# Patient Record
Sex: MALE | Race: WHITE | Employment: FULL TIME | URBAN - NONMETROPOLITAN AREA
[De-identification: names, ages, dates, MRNs, and addresses within clinical notes are randomized per-mention and may not be internally consistent; named-entity substitution may affect disease eponyms.]

---

## 2017-11-13 DIAGNOSIS — N20.0 CALCULUS OF KIDNEY: ICD-10-CM

## 2018-01-05 LAB
BUN BLDV-MCNC: 24 MG/DL
CALCIUM SERPL-MCNC: 8.8 MG/DL
CHLORIDE BLD-SCNC: 100 MMOL/L
CO2: 28 MMOL/L
CREAT SERPL-MCNC: 0.98 MG/DL
CREATININE, URINE: 208
GFR CALCULATED: >60
GLUCOSE BLD-MCNC: 183 MG/DL
MICROALBUMIN/CREAT 24H UR: 6.3 MG/G{CREAT}
MICROALBUMIN/CREAT UR-RTO: 3
POTASSIUM SERPL-SCNC: 4.3 MMOL/L
SODIUM BLD-SCNC: 136 MMOL/L

## 2018-01-26 ENCOUNTER — OFFICE VISIT (OUTPATIENT)
Dept: NEPHROLOGY | Age: 48
End: 2018-01-26
Payer: COMMERCIAL

## 2018-01-26 VITALS
SYSTOLIC BLOOD PRESSURE: 138 MMHG | OXYGEN SATURATION: 97 % | HEART RATE: 69 BPM | WEIGHT: 280.8 LBS | DIASTOLIC BLOOD PRESSURE: 78 MMHG

## 2018-01-26 DIAGNOSIS — R82.994 HYPERCALCINURIA: Primary | ICD-10-CM

## 2018-01-26 DIAGNOSIS — I10 BENIGN ESSENTIAL HTN: ICD-10-CM

## 2018-01-26 PROCEDURE — 99213 OFFICE O/P EST LOW 20 MIN: CPT | Performed by: INTERNAL MEDICINE

## 2018-01-26 RX ORDER — HYDROCHLOROTHIAZIDE 25 MG/1
25 TABLET ORAL DAILY
Qty: 100 TABLET | Refills: 3 | Status: SHIPPED | OUTPATIENT
Start: 2018-01-26 | End: 2019-01-23 | Stop reason: SDUPTHER

## 2018-01-26 ASSESSMENT — ENCOUNTER SYMPTOMS
CHEST TIGHTNESS: 0
RESPIRATORY NEGATIVE: 1
BACK PAIN: 0
COUGH: 0
FACIAL SWELLING: 0
NAUSEA: 0
CONSTIPATION: 0
BLOOD IN STOOL: 0
ABDOMINAL DISTENTION: 0
VOMITING: 0
WHEEZING: 0
ABDOMINAL PAIN: 0
SHORTNESS OF BREATH: 0
GASTROINTESTINAL NEGATIVE: 1
DIARRHEA: 0

## 2018-01-26 NOTE — PROGRESS NOTES
Visit Date: 1/26/2018      HPI:     Gayathri Shelton is a 56 y.o. male who presents today for:  Chief Complaint   Patient presents with    Nephrolithiasis    Hypertension       Current Outpatient Prescriptions   Medication Sig Dispense Refill    Lactobacillus (PROBIOTIC ACIDOPHILUS PO) Take by mouth      glyBURIDE-metformin (GLUCOVANCE) 2.5-500 MG per tablet Take 1 tablet by mouth 2 times daily (with meals)       allopurinol (ZYLOPRIM) 300 MG tablet Take 300 mg by mouth daily      losartan-hydrochlorothiazide (HYZAAR) 100-12.5 MG per tablet Take 1 tablet by mouth daily      Triamcinolone Acetonide (NASACORT ALLERGY 24HR NA) by Nasal route      naproxen sodium (ANAPROX) 550 MG tablet Take 550 mg by mouth 2 times daily (with meals)       No current facility-administered medications for this visit. No Known Allergies    Past Medical History:   Diagnosis Date    Diabetes ([address]     Hypertension     Kidney stone       Past Surgical History:   Procedure Laterality Date    HERNIA REPAIR       Family History   Problem Relation Age of Onset    Celiac Disease Mother     Urolithiasis Father     Diabetes Father     High Blood Pressure Father      Social History   Substance Use Topics    Smoking status: Never Smoker    Smokeless tobacco: Never Used    Alcohol use Not on file        Subjective:      Review of Systems   Constitutional: Negative. Negative for activity change, appetite change, fatigue and unexpected weight change. HENT: Negative for facial swelling and nosebleeds. Eyes: Negative for visual disturbance. Respiratory: Negative. Negative for cough, chest tightness, shortness of breath and wheezing. Cardiovascular: Negative. Negative for chest pain, palpitations and leg swelling. Gastrointestinal: Negative. Negative for abdominal distention, abdominal pain, blood in stool, constipation, diarrhea, nausea and vomiting.    Endocrine: Negative for cold intolerance, polydipsia and polyuria. Genitourinary: Negative for decreased urine volume, difficulty urinating, dysuria, flank pain, frequency, hematuria, scrotal swelling and urgency. NO STONE EVENTS   Musculoskeletal: Negative. Negative for back pain. Skin: Negative. Negative for wound. Neurological: Negative. Negative for dizziness, syncope, weakness, light-headedness, numbness and headaches. Hematological: Does not bruise/bleed easily. Psychiatric/Behavioral: Negative. Negative for confusion and sleep disturbance. The patient is not nervous/anxious. Objective:     /78 (Site: Right Arm, Position: Sitting)   Pulse 69   Wt 280 lb 12.8 oz (127.4 kg)   SpO2 97%   Weight unchanged  Physical Exam   Constitutional: He is oriented to person, place, and time. He appears well-developed and well-nourished. No distress. Neck: No JVD present. Cardiovascular: Normal rate, regular rhythm, normal heart sounds and intact distal pulses. Exam reveals no gallop and no friction rub. No murmur heard. Pulmonary/Chest: Effort normal and breath sounds normal. No respiratory distress. He has no wheezes. He has no rales. He exhibits no tenderness. Abdominal: Soft. Bowel sounds are normal. He exhibits no distension. There is no tenderness. There is no guarding. Musculoskeletal: Normal range of motion. He exhibits no edema or tenderness. Lymphadenopathy:     He has no cervical adenopathy. Neurological: He is alert and oriented to person, place, and time. Skin: Skin is warm and dry. No rash noted. He is not diaphoretic. No pallor. Psychiatric: He has a normal mood and affect. His behavior is normal. Judgment and thought content normal.   Nursing note and vitals reviewed.     CBC: No results found for: WBC, HGB, HCT, MCV, PLT  BMP:    Lab Results   Component Value Date     01/05/2018     10/22/2016    K 4.3 01/05/2018    K 4.3 10/22/2016     01/05/2018     10/22/2016    CO2 28 01/05/2018

## 2019-01-23 RX ORDER — HYDROCHLOROTHIAZIDE 25 MG/1
25 TABLET ORAL DAILY
Qty: 100 TABLET | Refills: 3 | Status: SHIPPED | OUTPATIENT
Start: 2019-01-23

## 2019-01-25 ENCOUNTER — OFFICE VISIT (OUTPATIENT)
Dept: NEPHROLOGY | Age: 49
End: 2019-01-25
Payer: COMMERCIAL

## 2019-01-25 VITALS
SYSTOLIC BLOOD PRESSURE: 145 MMHG | OXYGEN SATURATION: 96 % | DIASTOLIC BLOOD PRESSURE: 95 MMHG | HEART RATE: 70 BPM | WEIGHT: 277 LBS

## 2019-01-25 DIAGNOSIS — N20.0 CALCIUM NEPHROLITHIASIS: ICD-10-CM

## 2019-01-25 DIAGNOSIS — R82.994 HYPERCALCINURIA: Primary | ICD-10-CM

## 2019-01-25 DIAGNOSIS — I10 BENIGN ESSENTIAL HTN: ICD-10-CM

## 2019-01-25 PROCEDURE — 99213 OFFICE O/P EST LOW 20 MIN: CPT | Performed by: INTERNAL MEDICINE

## 2020-01-21 LAB
ALBUMIN SERPL-MCNC: 3.7 G/DL
ALP BLD-CCNC: 61 U/L
ALT SERPL-CCNC: 49 U/L
ANION GAP SERPL CALCULATED.3IONS-SCNC: NORMAL MMOL/L
AST SERPL-CCNC: 22 U/L
BILIRUB SERPL-MCNC: 0.3 MG/DL (ref 0.1–1.4)
BILIRUBIN, URINE: NEGATIVE
BLOOD, URINE: NEGATIVE
BUN BLDV-MCNC: 21 MG/DL
CALCIUM SERPL-MCNC: 8.6 MG/DL
CHLORIDE BLD-SCNC: 99 MMOL/L
CLARITY: CLEAR
CO2: 30 MMOL/L
COLOR: YELLOW
CREAT SERPL-MCNC: 1.01 MG/DL
CREATININE, URINE: 188
GFR CALCULATED: >60
GLUCOSE BLD-MCNC: 100 MG/DL
GLUCOSE URINE: NEGATIVE
KETONES, URINE: NEGATIVE
LEUKOCYTE ESTERASE, URINE: NEGATIVE
MICROALBUMIN/CREAT 24H UR: 9.6 MG/G{CREAT}
MICROALBUMIN/CREAT UR-RTO: 5
NITRITE, URINE: NEGATIVE
PH UA: 6 (ref 4.5–8)
POTASSIUM SERPL-SCNC: 4.2 MMOL/L
PROTEIN UA: NEGATIVE
SODIUM BLD-SCNC: 136 MMOL/L
SPECIFIC GRAVITY, URINE: 1.02
TOTAL PROTEIN: 7.5
URIC ACID: 3.6
UROBILINOGEN, URINE: NORMAL

## 2020-01-24 ENCOUNTER — OFFICE VISIT (OUTPATIENT)
Dept: NEPHROLOGY | Age: 50
End: 2020-01-24
Payer: COMMERCIAL

## 2020-01-24 VITALS
OXYGEN SATURATION: 97 % | DIASTOLIC BLOOD PRESSURE: 89 MMHG | SYSTOLIC BLOOD PRESSURE: 125 MMHG | WEIGHT: 281 LBS | HEART RATE: 79 BPM

## 2020-01-24 PROCEDURE — 99213 OFFICE O/P EST LOW 20 MIN: CPT | Performed by: INTERNAL MEDICINE

## 2020-01-24 RX ORDER — PIOGLITAZONEHYDROCHLORIDE 30 MG/1
30 TABLET ORAL DAILY
COMMUNITY

## 2020-01-24 NOTE — PROGRESS NOTES
100          [facility] 3 5                        Renal Ultrasound Scan -- will obtain from cold water       Assessment    1. Renal - Patient's renal function is stable, probably stage II chronic kidney disease due to nephrolithiasis  -No episode of renal stones in the last 4-5 years. Renal function is stable  -Do not have any ultrasound scan will obtain 1  2. Essential hypertension overall running well continue current medications  3. Nephrolithiasis-reviewed the stone analysis he did have calcium oxalate stones in the past   - His uric acid at that time is normal I do not find any evidence why he would need allopurinol at this time . Defer to the PCP   - Meanwhile I have encouraged the patient to drink at least 2-3 L of fluids a day and low-salt diet. - Meanwhile continue hydrochlorothiazide to reduce urinary calcium excretion  4. meds reviewed and D/W patient  5. Follow-up in one year. If renal ultrasound scan is okay may discharge him from the clinic    Tests and orders placed this Encounter     No orders of the defined types were placed in this encounter. Magdalena Ryder M.D  [facility].

## 2020-02-12 ENCOUNTER — TELEPHONE (OUTPATIENT)
Dept: NEPHROLOGY | Age: 50
End: 2020-02-12

## 2020-02-12 NOTE — TELEPHONE ENCOUNTER
----- Message from Bryon Casey MD sent at 2/12/2020 11:04 AM EST -----  Please let patient know that the kidney ultrasound scan is normal

## 2021-01-18 ENCOUNTER — TELEPHONE (OUTPATIENT)
Dept: NEPHROLOGY | Age: 51
End: 2021-01-18

## 2021-09-20 ENCOUNTER — TELEPHONE (OUTPATIENT)
Dept: NEPHROLOGY | Age: 51
End: 2021-09-20

## 2021-09-20 NOTE — TELEPHONE ENCOUNTER
Patient called back. He said he really can't pee much and when he does it is blood.  He is going to go to [facility].

## 2021-09-20 NOTE — TELEPHONE ENCOUNTER
Pt called and states he had some blood in his urine last week. He states it is better now. He feels like he has to urinate all the time. What do you want to do?

## 2021-09-21 ENCOUNTER — TELEPHONE (OUTPATIENT)
Dept: NEPHROLOGY | Age: 51
End: 2021-09-21

## 2021-09-21 DIAGNOSIS — N13.2 HYDRONEPHROSIS WITH RENAL AND URETERAL CALCULUS OBSTRUCTION: Primary | ICD-10-CM

## 2021-09-21 NOTE — TELEPHONE ENCOUNTER
Patient informed of referral to Dr. Vilma Marlow. Prior to your directives I already have patient scheduled this Friday in [facility]. Do you want me to move him out to the 3-4 weeks? Patient states he is having knee surgery in November he does not want it to interfere with that.

## 2021-09-21 NOTE — TELEPHONE ENCOUNTER
Patient said he is a . He wants to know what he should do about work? He is going to try to go back tomorrow. He sees the urologist on 9/23/21. I suggested he goes back to work and if he needs Dr. Sarah Serna to right him off for a couple days he probably would do that.

## 2021-09-21 NOTE — TELEPHONE ENCOUNTER
I looked at his scan and labs. Move it to next month. I want to see him after the urology has seen him.   Call urology office and I want them to see him soon this week or next week

## 2021-10-08 ENCOUNTER — OFFICE VISIT (OUTPATIENT)
Dept: NEPHROLOGY | Age: 51
End: 2021-10-08
Payer: COMMERCIAL

## 2021-10-08 VITALS
OXYGEN SATURATION: 98 % | WEIGHT: 283 LBS | SYSTOLIC BLOOD PRESSURE: 122 MMHG | DIASTOLIC BLOOD PRESSURE: 82 MMHG | HEART RATE: 71 BPM

## 2021-10-08 DIAGNOSIS — I10 HTN (HYPERTENSION), BENIGN: ICD-10-CM

## 2021-10-08 DIAGNOSIS — N20.0 RENAL STONES: Primary | ICD-10-CM

## 2021-10-08 DIAGNOSIS — N13.2 HYDRONEPHROSIS WITH RENAL AND URETERAL CALCULUS OBSTRUCTION: ICD-10-CM

## 2021-10-08 PROCEDURE — 99214 OFFICE O/P EST MOD 30 MIN: CPT | Performed by: INTERNAL MEDICINE

## 2021-10-08 RX ORDER — ANTACID TABLETS 648 MG/1
1 TABLET, CHEWABLE ORAL 2 TIMES DAILY
Qty: 180 TABLET | Refills: 3 | Status: SHIPPED | OUTPATIENT
Start: 2021-10-08 | End: 2022-10-08

## 2021-10-08 NOTE — PROGRESS NOTES
[facility]        Outpatient Follow-Up note         10/8/2021 10:30 AM    Patient Name:   Amna Cervantes:    1970  Primary Care Physician:  Pan Goldberg     Chief Complaint / Reason for follow-up : Follow Up of nephrolithiasis     Interval History :  Patient seen and examined by me. Feels well. No cp or SOB . had episodes of hematuria, had a kidney stone which was removed  Had it removed surgically on 9/24/21     Past History :  Past Medical History:   Diagnosis Date    Diabetes ([address]     Hypertension     Kidney stone      Past Surgical History:   Procedure Laterality Date    HERNIA REPAIR          Medications :     Outpatient Medications Marked as Taking for the 10/8/21 encounter (Office Visit) with Makayla Addison MD   Medication Sig Dispense Refill    CINNAMON PO Take 1 tablet by mouth daily      pioglitazone (ACTOS) 30 MG tablet Take 30 mg by mouth daily      metFORMIN (GLUCOPHAGE) 500 MG tablet Take 500 mg by mouth 2 times daily (with meals)      hydrochlorothiazide (HYDRODIURIL) 25 MG tablet Take 1 tablet by mouth daily 100 tablet 3    glyBURIDE-metFORMIN (GLUCOVANCE) 5-500 MG per tablet Take 1 tablet by mouth 2 times daily (with meals)       allopurinol (ZYLOPRIM) 300 MG tablet Take 300 mg by mouth daily      losartan-hydrochlorothiazide (HYZAAR) 100-12.5 MG per tablet Take 1 tablet by mouth daily         Vitals     /82 (Site: Left Upper Arm, Position: Sitting, Cuff Size: Medium Adult)   Pulse 71   Wt 283 lb (128.4 kg)   SpO2 98%  Wt Readings from Last 3 Encounters:   10/08/21 283 lb (128.4 kg)   01/24/20 281 lb (127.5 kg)   01/25/19 277 lb (125.6 kg)        Physical Exam     General -- no distress  Lungs -- clear  Heart -- S1, S2 heard, JVD - no  Abdomen - soft, non-tender  Extremities -- no edema  CNS - awake and alert    Labs, Radiology and Tests    Labs -    1/19 1/20 9/21         Potassium 4.3 4.2 3.9         BUN 24 21 28 Creatinine 0.98 1.01 0.9         eGFR > 60 >60 >60                     UPCR  100          [facility] 3 5                        Renal Ultrasound Scan -- will obtain from cold water       Assessment    1. Renal - Patient's renal function is stable, probably stage II chronic kidney disease due to nephrolithiasis  -No episode of renal stones in the last 5 years. Had one episode again last month S/P removal  2. Essential hypertension overall running well continue current medications  3. Nephrolithiasis-reviewed the stone analysis he did have calcium oxalate stones in the past   - His uric acid at that time is normal I do not find any evidence why he would need allopurinol at this time . Defer to the PCP   - Meanwhile I have encouraged the patient to drink at least 2-3 L of fluids a day and low-salt diet. - Meanwhile continue hydrochlorothiazide to reduce urinary calcium excretion   - add oscal 500 BID with meals  4. meds reviewed and D/W patient  5. Follow-up in one year. Tests and orders placed this Encounter     No orders of the defined types were placed in this encounter. Abdirizak Stinson M.D  [facility].